# Patient Record
Sex: MALE | URBAN - METROPOLITAN AREA
[De-identification: names, ages, dates, MRNs, and addresses within clinical notes are randomized per-mention and may not be internally consistent; named-entity substitution may affect disease eponyms.]

---

## 2024-01-01 ENCOUNTER — APPOINTMENT (OUTPATIENT)
Dept: RADIOLOGY | Facility: HOSPITAL | Age: 34
End: 2024-01-01
Payer: COMMERCIAL

## 2024-01-01 ENCOUNTER — HOSPITAL ENCOUNTER (EMERGENCY)
Facility: HOSPITAL | Age: 34
Discharge: HOME | End: 2024-01-01
Attending: EMERGENCY MEDICINE
Payer: COMMERCIAL

## 2024-01-01 VITALS
SYSTOLIC BLOOD PRESSURE: 142 MMHG | HEIGHT: 72 IN | WEIGHT: 190.26 LBS | HEART RATE: 90 BPM | TEMPERATURE: 98.6 F | BODY MASS INDEX: 25.77 KG/M2 | DIASTOLIC BLOOD PRESSURE: 88 MMHG | RESPIRATION RATE: 18 BRPM | OXYGEN SATURATION: 98 %

## 2024-01-01 DIAGNOSIS — L03.119 CELLULITIS OF HAND: Primary | ICD-10-CM

## 2024-01-01 LAB
ALBUMIN SERPL-MCNC: 4.6 G/DL (ref 3.5–5)
ALP BLD-CCNC: 67 U/L (ref 35–125)
ALT SERPL-CCNC: 21 U/L (ref 5–40)
ANION GAP SERPL CALC-SCNC: 11 MMOL/L
AST SERPL-CCNC: 20 U/L (ref 5–40)
BASOPHILS # BLD AUTO: 0.02 X10*3/UL (ref 0–0.1)
BASOPHILS NFR BLD AUTO: 0.2 %
BILIRUB SERPL-MCNC: 0.5 MG/DL (ref 0.1–1.2)
BUN SERPL-MCNC: 10 MG/DL (ref 8–25)
CALCIUM SERPL-MCNC: 9 MG/DL (ref 8.5–10.4)
CHLORIDE SERPL-SCNC: 97 MMOL/L (ref 97–107)
CO2 SERPL-SCNC: 25 MMOL/L (ref 24–31)
CREAT SERPL-MCNC: 0.9 MG/DL (ref 0.4–1.6)
EOSINOPHIL # BLD AUTO: 0.03 X10*3/UL (ref 0–0.7)
EOSINOPHIL NFR BLD AUTO: 0.3 %
ERYTHROCYTE [DISTWIDTH] IN BLOOD BY AUTOMATED COUNT: 11.2 % (ref 11.5–14.5)
GFR SERPL CREATININE-BSD FRML MDRD: >90 ML/MIN/1.73M*2
GLUCOSE SERPL-MCNC: 144 MG/DL (ref 65–99)
HCT VFR BLD AUTO: 41 % (ref 41–52)
HGB BLD-MCNC: 14.5 G/DL (ref 13.5–17.5)
IMM GRANULOCYTES # BLD AUTO: 0.02 X10*3/UL (ref 0–0.7)
IMM GRANULOCYTES NFR BLD AUTO: 0.2 % (ref 0–0.9)
LACTATE BLDV-SCNC: 1.7 MMOL/L (ref 0.4–2)
LYMPHOCYTES # BLD AUTO: 0.74 X10*3/UL (ref 1.2–4.8)
LYMPHOCYTES NFR BLD AUTO: 7 %
MCH RBC QN AUTO: 30.6 PG (ref 26–34)
MCHC RBC AUTO-ENTMCNC: 35.4 G/DL (ref 32–36)
MCV RBC AUTO: 87 FL (ref 80–100)
MONOCYTES # BLD AUTO: 0.45 X10*3/UL (ref 0.1–1)
MONOCYTES NFR BLD AUTO: 4.2 %
NEUTROPHILS # BLD AUTO: 9.37 X10*3/UL (ref 1.2–7.7)
NEUTROPHILS NFR BLD AUTO: 88.1 %
NRBC BLD-RTO: 0 /100 WBCS (ref 0–0)
PLATELET # BLD AUTO: 217 X10*3/UL (ref 150–450)
POTASSIUM SERPL-SCNC: 3.5 MMOL/L (ref 3.4–5.1)
PROT SERPL-MCNC: 7.4 G/DL (ref 5.9–7.9)
RBC # BLD AUTO: 4.74 X10*6/UL (ref 4.5–5.9)
SODIUM SERPL-SCNC: 133 MMOL/L (ref 133–145)
WBC # BLD AUTO: 10.6 X10*3/UL (ref 4.4–11.3)

## 2024-01-01 PROCEDURE — 73130 X-RAY EXAM OF HAND: CPT | Mod: RT

## 2024-01-01 PROCEDURE — 99284 EMERGENCY DEPT VISIT MOD MDM: CPT | Performed by: EMERGENCY MEDICINE

## 2024-01-01 PROCEDURE — 96365 THER/PROPH/DIAG IV INF INIT: CPT

## 2024-01-01 PROCEDURE — 87040 BLOOD CULTURE FOR BACTERIA: CPT | Mod: TRILAB | Performed by: PHYSICIAN ASSISTANT

## 2024-01-01 PROCEDURE — 80053 COMPREHEN METABOLIC PANEL: CPT | Performed by: PHYSICIAN ASSISTANT

## 2024-01-01 PROCEDURE — 36415 COLL VENOUS BLD VENIPUNCTURE: CPT | Performed by: PHYSICIAN ASSISTANT

## 2024-01-01 PROCEDURE — 2500000004 HC RX 250 GENERAL PHARMACY W/ HCPCS (ALT 636 FOR OP/ED): Performed by: PHYSICIAN ASSISTANT

## 2024-01-01 PROCEDURE — 83605 ASSAY OF LACTIC ACID: CPT | Performed by: PHYSICIAN ASSISTANT

## 2024-01-01 PROCEDURE — 85025 COMPLETE CBC W/AUTO DIFF WBC: CPT | Performed by: PHYSICIAN ASSISTANT

## 2024-01-01 RX ORDER — CLINDAMYCIN PHOSPHATE 600 MG/50ML
600 INJECTION, SOLUTION INTRAVENOUS ONCE
Status: COMPLETED | OUTPATIENT
Start: 2024-01-01 | End: 2024-01-01

## 2024-01-01 RX ORDER — CLINDAMYCIN HYDROCHLORIDE 300 MG/1
300 CAPSULE ORAL 4 TIMES DAILY
Qty: 40 CAPSULE | Refills: 0 | Status: SHIPPED | OUTPATIENT
Start: 2024-01-01 | End: 2024-01-11

## 2024-01-01 RX ADMIN — CLINDAMYCIN PHOSPHATE 600 MG: 600 INJECTION, SOLUTION INTRAVENOUS at 16:36

## 2024-01-01 RX ADMIN — SODIUM CHLORIDE 1000 ML: 900 INJECTION, SOLUTION INTRAVENOUS at 16:37

## 2024-01-01 ASSESSMENT — COLUMBIA-SUICIDE SEVERITY RATING SCALE - C-SSRS
1. IN THE PAST MONTH, HAVE YOU WISHED YOU WERE DEAD OR WISHED YOU COULD GO TO SLEEP AND NOT WAKE UP?: NO
6. HAVE YOU EVER DONE ANYTHING, STARTED TO DO ANYTHING, OR PREPARED TO DO ANYTHING TO END YOUR LIFE?: NO
2. HAVE YOU ACTUALLY HAD ANY THOUGHTS OF KILLING YOURSELF?: NO

## 2024-01-01 ASSESSMENT — PAIN - FUNCTIONAL ASSESSMENT: PAIN_FUNCTIONAL_ASSESSMENT: 0-10

## 2024-01-01 ASSESSMENT — PAIN SCALES - GENERAL: PAINLEVEL_OUTOF10: 3

## 2024-01-01 NOTE — ED PROVIDER NOTES
HPI   Chief Complaint   Patient presents with    Arm Pain     Pt states he has pain and redness streaking up his right arm starting at a wound on his hand.  Symptoms have been getting worse today.  States he has fever and chills. Took tylenol and augmentin at home.        33-year-old male presented emergency department with a chief complaint of infection on his right arm.  He states his symptoms have been for the last 36 hours.  He was in a hot tub.  He has a small wound on his palm of his right hand that has lymphangitic streaking up his arm.  He denies any difficulty with range of motion of upper extremity digits.  Denies diabetes.  Well-appearing nontoxic.  Took 1 dose of Augmentin.  Was seen in urgent care then referred to the emergency department.  The patient does work as an ophthalmology physician.                          Amie Coma Scale Score: 15                  Patient History   History reviewed. No pertinent past medical history.  History reviewed. No pertinent surgical history.  No family history on file.  Social History     Tobacco Use    Smoking status: Never    Smokeless tobacco: Never   Substance Use Topics    Alcohol use: Not on file    Drug use: Not on file       Physical Exam   ED Triage Vitals [01/01/24 1606]   Temp Heart Rate Resp BP   37 °C (98.6 °F) (!) 122 18 (!) 154/102      SpO2 Temp src Heart Rate Source Patient Position   100 % -- -- --      BP Location FiO2 (%)     -- --       Physical Exam  Vitals and nursing note reviewed.   Constitutional:       Appearance: Normal appearance.   HENT:      Head: Normocephalic and atraumatic.      Nose: Nose normal.      Mouth/Throat:      Mouth: Mucous membranes are moist.   Eyes:      Pupils: Pupils are equal, round, and reactive to light.   Cardiovascular:      Rate and Rhythm: Normal rate and regular rhythm.   Pulmonary:      Effort: Pulmonary effort is normal.      Breath sounds: Normal breath sounds.   Abdominal:      General: Abdomen is flat.    Musculoskeletal:         General: Normal range of motion.      Cervical back: Normal range of motion.      Comments: For range of motion of upper extremity digits, able to flex and extend at level of elbow   Skin:     Comments: Patient with redness to the dorsum of right hand with lymphangitic streaking proximally   Neurological:      General: No focal deficit present.      Mental Status: He is alert and oriented to person, place, and time.   Psychiatric:         Mood and Affect: Mood normal.         Behavior: Behavior normal.         ED Course & MDM   Diagnoses as of 01/01/24 1718   Cellulitis of hand       Medical Decision Making  I have seen and evaluated this patient.  The attending physician has also seen and evaluated this patient.  Vital signs, laboratory testing and diagnostic images if applicable have been reviewed.  All laboratory and imaging is interpreted by myself unless otherwise stated.  Radiology studies are also formally interpreted by radiologist.    CBC without significant leukocytosis or anemia, metabolic panel without significant renal impairment or electrolyte abnormality.  Normal lactic, blood cultures sent.  Tachycardia resolved.  Overall impression is cellulitis.  Clindamycin initiated.  Released in good condition.    Labs Reviewed  CBC WITH AUTO DIFFERENTIAL - Abnormal     WBC                           10.6                   nRBC                          0.0                    RBC                           4.74                   Hemoglobin                    14.5                   Hematocrit                    41.0                   MCV                           87                     MCH                           30.6                   MCHC                          35.4                   RDW                           11.2 (*)               Platelets                     217                    Neutrophils %                 88.1                   Immature Granulocytes %, Automated   0.2                     Lymphocytes %                 7.0                    Monocytes %                   4.2                    Eosinophils %                 0.3                    Basophils %                   0.2                    Neutrophils Absolute          9.37 (*)               Immature Granulocytes Absolute, Au*   0.02                   Lymphocytes Absolute          0.74 (*)               Monocytes Absolute            0.45                   Eosinophils Absolute          0.03                   Basophils Absolute            0.02                COMPREHENSIVE METABOLIC PANEL - Abnormal     Glucose                       144 (*)                Sodium                        133                    Potassium                     3.5                    Chloride                      97                     Bicarbonate                   25                     Urea Nitrogen                 10                     Creatinine                    0.90                   eGFR                          >90                    Calcium                       9.0                    Albumin                       4.6                    Alkaline Phosphatase          67                     Total Protein                 7.4                    AST                           20                     Bilirubin, Total              0.5                    ALT                           21                     Anion Gap                     11                  BLOOD GAS LACTIC ACID, VENOUS - Normal     POCT Lactate, Venous          1.7                 BLOOD CULTURE    XR hand right 3+ views   Final Result    No acute bony or articular abnormality.          Signed by: Faheem Calle 1/1/2024 5:07 PM    Dictation workstation:   [id number]              Your medication list        START taking these medications        Instructions Last Dose Given Next Dose Due   clindamycin 300 mg capsule  Commonly known as: Cleocin      Take 1 capsule (300 mg) by mouth 4 times a  day for 10 days.                 Where to Get Your Medications        You can get these medications from any pharmacy    Bring a paper prescription for each of these medications  clindamycin 300 mg capsule           Procedure  Procedures     Yuniel Rhodes PA-C  01/01/24 3257

## 2024-01-06 LAB — BACTERIA BLD CULT: NORMAL
